# Patient Record
Sex: FEMALE | Race: WHITE
[De-identification: names, ages, dates, MRNs, and addresses within clinical notes are randomized per-mention and may not be internally consistent; named-entity substitution may affect disease eponyms.]

---

## 2022-06-03 ENCOUNTER — HOSPITAL ENCOUNTER (EMERGENCY)
Dept: HOSPITAL 26 - MED | Age: 22
Discharge: HOME | End: 2022-06-03
Payer: COMMERCIAL

## 2022-06-03 VITALS — DIASTOLIC BLOOD PRESSURE: 65 MMHG | SYSTOLIC BLOOD PRESSURE: 105 MMHG

## 2022-06-03 VITALS — SYSTOLIC BLOOD PRESSURE: 108 MMHG | DIASTOLIC BLOOD PRESSURE: 68 MMHG

## 2022-06-03 VITALS — BODY MASS INDEX: 22.65 KG/M2 | HEIGHT: 57 IN | WEIGHT: 105 LBS

## 2022-06-03 DIAGNOSIS — J45.909: ICD-10-CM

## 2022-06-03 DIAGNOSIS — Z20.822: ICD-10-CM

## 2022-06-03 DIAGNOSIS — Z79.899: ICD-10-CM

## 2022-06-03 DIAGNOSIS — B34.9: Primary | ICD-10-CM

## 2022-06-03 NOTE — NUR
20 Y/O FEMALE, PATIENT PRESENTS TO ED WITH C/O COUGH, FEVER 101F AT HOME, 
[place]ILL[place], "WHEN I TALK I FEEL TIRED", NEW ONSET OF SOB AND CP. PT STATES S/S 
STARTED LAST NIGHT, DENIES ANYONE SICK AT HOME WITH SAME SYMPTOMS. DENIES 
N/V/D; SKIN IS PINK/WARM/DRY; AAOX4 WITH EVEN AND STEADY GAIT; LUNGS CLEAR BL; 
HR EVEN AND TACHYCARDIC; PATIENT STATES PAIN OF 5/10 AT THIS TIME, THROAT 
PAIN;ER MD MADE AWARE OF PT STATUS.



PMH: ASTHMA

NKA

MED: ADVIL (LAST NIGHT)

## 2022-06-03 NOTE — NUR
Patient discharged with v/s stable. Written and verbal after care instructions 
given and explained. 

Patient alert, oriented and verbalized understanding of instructions. 
Ambulatory with steady gait. All questions addressed prior to discharge. ID 
band removed. Patient advised to follow up with PMD. Rx of ibuprofen adn 
promethazine/ dm syrup given.  Opportunity to ask questions provided and 
answered.

## 2022-08-29 ENCOUNTER — HOSPITAL ENCOUNTER (EMERGENCY)
Dept: HOSPITAL 26 - MED | Age: 22
Discharge: LEFT BEFORE DISCHARGE | End: 2022-08-29
Payer: COMMERCIAL

## 2022-08-29 DIAGNOSIS — T14.8XXA: Primary | ICD-10-CM
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
## 2022-08-29 DIAGNOSIS — Z53.21: ICD-10-CM
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
